# Patient Record
Sex: MALE | Race: BLACK OR AFRICAN AMERICAN
[De-identification: names, ages, dates, MRNs, and addresses within clinical notes are randomized per-mention and may not be internally consistent; named-entity substitution may affect disease eponyms.]

---

## 2019-08-02 ENCOUNTER — HOSPITAL ENCOUNTER (EMERGENCY)
Dept: HOSPITAL 63 - ER | Age: 24
LOS: 1 days | Discharge: HOME | End: 2019-08-03
Payer: SELF-PAY

## 2019-08-02 VITALS — HEIGHT: 70 IN | BODY MASS INDEX: 28.63 KG/M2 | WEIGHT: 200 LBS
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
## 2019-08-02 DIAGNOSIS — S93.402A: ICD-10-CM

## 2019-08-02 DIAGNOSIS — S92.102A: Primary | ICD-10-CM
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
## 2019-08-02 PROCEDURE — 29515 APPLICATION SHORT LEG SPLINT: CPT

## 2019-08-02 PROCEDURE — 73630 X-RAY EXAM OF FOOT: CPT

## 2019-08-02 PROCEDURE — 73610 X-RAY EXAM OF ANKLE: CPT

## 2019-08-02 PROCEDURE — 99284 EMERGENCY DEPT VISIT MOD MDM: CPT

## 2019-08-02 NOTE — ED.ADGEN
Adult General


Chief Complaint


Chief Complaint


"... I was playing foot ball last thursday.. and another player came down hard 

on this lt ankle and foot.. It still swollen and painful.. it burns like 

crazy..like if I ve been sleeping.. and get up out bed... "  " I hoping to get 

to play in next two games.."





HPI


HPI





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 

with above hx and complaints Lt foot and ankle injury.  Left foot and ankle is 

obviously swollen. It is ecchymotic. Pain is localized more on the lateral 

malleolus and mid foot.  Distal neurovascular intact. No upper leg tenderness. 

Patient denies other injury. Patient denies any health history of any 

significance. Patient recently from [place] where he went to [employer] 

at [facility].   Patient is here on football scholarship with [employer]





Review of Systems


Review of Systems





Constitutional: Denies fever or chills []


Eyes: Denies change in visual acuity, redness, or eye pain []


HENT: Denies nasal congestion or sore throat []


Respiratory: Denies cough or shortness of breath []


Cardiovascular: No additional information not addressed in HPI []


GI: Denies abdominal pain, nausea, vomiting, bloody stools or diarrhea []


: Denies dysuria or hematuria []


Musculoskeletal: Complains of left foot and ankle pain and edema


Integument: Denies rash or skin lesions []


Neurologic: Denies headache, focal weakness or sensory changes []


Endocrine: Denies polyuria or polydipsia []





All other systems were reviewed and found to be within normal limits, except as 

documented in this note.





Family History


Family History


Noncontributory





Current Medications


Current Medications


See nursing for home meds





Allergies


Allergies





Allergies








Coded Allergies Type Severity Reaction Last Updated Verified


 


  No Known Drug Allergies    19 No











Physical Exam


Physical Exam





Constitutional: Well developed, well nourished, moderately acute distress, non-

toxic appearance. []


HENT: Normocephalic, atraumatic, bilateral external ears normal, oropharynx 

moist, no oral exudates, nose normal. []


Eyes: PERRLA, EOMI, conjunctiva normal, no discharge. [] Glasses


Neck: Normal range of motion, no tenderness, supple, no stridor. [] 


Cardiovascular:Heart rate regular rhythm, no murmur []


Lungs & Thorax:  Bilateral breath sounds clear to auscultation []


Abdomen: Bowel sounds normal, soft, no tenderness, no masses, no pulsatile 

masses. [] 


Skin: Warm, dry, no erythema, no rash. [] 


Back: No tenderness, no CVA tenderness. [] 


Extremities: No tenderness, no cyanosis, no clubbing, ROM intact, no edema. [] 

Except findings in left ankle and foot


Neurologic: Alert and oriented X 3, normal motor function, normal sensory 

function, no focal deficits noted. []


Psychologic: Affect anxious, judgement normal, mood normal. []





Current Patient Data


Vital Signs





                                   Vital Signs








  Date Time  Temp Pulse Resp B/P (MAP) Pulse Ox O2 Delivery O2 Flow Rate FiO2


 


8/3/19 00:35  64 20 148/84 (105) 98 Room Air  


 


19 22:48 97.6       











EKG


EKG


[]





Radiology/Procedures


Radiology/Procedures


[][facility] [address], [place] [address] [phone]


                              [facility] [address], [place] [address] [phone]


                                        


                                 IMAGING REPORT





                                     Signed





PATIENT: JOCELYNE MERCEDES   ACCOUNT: [id number]     MRN#: [id number]


: 1995           LOCATION: ER              AGE: 24


SEX: M                    EXAM DT: 19         ACCESSION#: [id number]


STATUS: REG ER            ORD. PHYSICIAN: LAZARO RENEE MD   


REASON: Left ankle and foot football injury x1 week ago.


PROCEDURE: FOOT LEFT 3V





EXAM: 


AP, oblique and lateral views left foot


AP, oblique and lateral views left ankle


 


DATE: 2019 10:54 PM


 


INDICATION: Left ankle and foot injury one week ago with pain


 


COMPARISON: No Prior


 


FINDINGS:


Well-corticated ossicle posterior to the talus may represent os trigonum 


given the cortication. However posterior talar avulsion fracture are not 


excluded. Fat infiltration is seen within indicators fat pad. Achilles 


tendon silhouette is grossly preserved. Ankle mortise and talar dome are 


intact.


 


Diffuse soft tissue swelling is seen about the left foot and ankle. Hallux


valgus. Bipartite medial hallux MTP sesamoid. No tarsometatarsal offset.


 


IMPRESSION:


Well-corticated ossicle posterior to the talus may represent os trigonum 


or posterior talar process avulsion fracture.


Mild fat infiltration is seen within indicators fat pad.


Diffuse soft tissue swelling about the left foot and ankle.


Hallux valgus


 


Electronically signed by: Valentino Ramirez MD (2019 11:18 PM) [facility] [place] [facility]














DICTATED AND SIGNED BY:     VALENTINO RAMIREZ MD


DATE:     19





CC: LAZARO RENEE MD; PCP,NO ~





                                 IMAGING REPORT





                                     Signed





PATIENT: JOCELYNE MERCEDES   ACCOUNT: [id number]     MRN#: [id number]


: 1995           LOCATION: ER              AGE: 24


SEX: M                    EXAM DT: 19         ACCESSION#: [id number]


STATUS: REG ER            ORD. PHYSICIAN: LAZARO RENEE MD   


REASON: Left ankle and foot football injury x1 week ago.


PROCEDURE: ANKLE LEFT 3V





EXAM: 


AP, oblique and lateral views left foot


AP, oblique and lateral views left ankle


 


DATE: 2019 10:54 PM


 


INDICATION: Left ankle and foot injury one week ago with pain


 


COMPARISON: No Prior


 


FINDINGS:


Well-corticated ossicle posterior to the talus may represent os trigonum 


given the cortication. However posterior talar avulsion fracture are not 


excluded. Fat infiltration is seen within indicators fat pad. Achilles 


tendon silhouette is grossly preserved. Ankle mortise and talar dome are 


intact.


 


Diffuse soft tissue swelling is seen about the left foot and ankle. Hallux


valgus. Bipartite medial hallux MTP sesamoid. No tarsometatarsal offset.


 


IMPRESSION:


Well-corticated ossicle posterior to the talus may represent os trigonum 


or posterior talar process avulsion fracture.


Mild fat infiltration is seen within indicators fat pad.


Diffuse soft tissue swelling about the left foot and ankle.


Hallux valgus


 


Electronically signed by: Valentino Ramirez MD (2019 11:18 PM) [facility] [place]














DICTATED AND SIGNED BY:     VALENTINO RAMIREZ MD


DATE:     19





CC: LAZARO RENEE MD; PCP,NO ~





Course & Med Decision Making


Course & Med Decision Making


Pertinent Labs and Imaging studies reviewed. (See chart for details)





Distal neurovascular intact post splint.  





Patient use crutches and where splint. Patient keep ankle and foot elevated. 

Patient take Tylenol and ibuprofen for pain. Patient follow-up primary care. 

Patient follow-up orthopedics. Patient return if any concerns.





[]





Final Impression


Final Impression


1. Left ankle and foot sprain strain[]


2. Evulsion fracture talus left foot





Dragon Disclaimer


Dragon Disclaimer


This electronic medical record was generated, in whole or in part, using a voice

 recognition dictation system.





Discharge Summary


Visit Information


Final Diagnosis


Problems


Medical Problems:


(1) Ankle sprain


Status: Acute  





(2) Avulsion fracture


Status: Acute  





(3) Sprain of foot, left


Status: Acute  











Brief Hospital Course


Allergies





                                    Allergies








Coded Allergies Type Severity Reaction Last Updated Verified


 


  No Known Drug Allergies    19 No








Vital Signs





Vital Signs








  Date Time  Temp Pulse Resp B/P (MAP) Pulse Ox O2 Delivery O2 Flow Rate FiO2


 


8/3/19 00:35  64 20 148/84 (105) 98 Room Air  


 


19 22:48 97.6       








Brief Hospital Course


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 

presented with Lt ankle and foot sprain.  Appears to have avulsion fx. talus.





Discharge Information


Condition at Discharge:  Stable


Disposition/Orders:  D/C to Home


Dischare Medications





Active Scripts


Active


Reported


No Known Medications Prior To Admisstion (Info)  Each 1 Each MC PRN PRN








Dragon Disclaimer


This chart was dictated in whole or in part using Voice Recognition software in 

a busy, high-work load, and often noisy Emergency Department environment.  It 

may contain unintended and wholly unrecognized errors or omissions.











LAZARO RENEE MD            Aug 2, 2019 22:38

## 2019-08-02 NOTE — RAD
EXAM: 

AP, oblique and lateral views left foot

AP, oblique and lateral views left ankle

 

DATE: 8/2/2019 10:54 PM

 

INDICATION: Left ankle and foot injury one week ago with pain

 

COMPARISON: No Prior

 

FINDINGS:

Well-corticated ossicle posterior to the talus may represent os trigonum 

given the cortication. However posterior talar avulsion fracture are not 

excluded. Fat infiltration is seen within indicators fat pad. Achilles 

tendon silhouette is grossly preserved. Ankle mortise and talar dome are 

intact.

 

Diffuse soft tissue swelling is seen about the left foot and ankle. Hallux

valgus. Bipartite medial hallux MTP sesamoid. No tarsometatarsal offset.

 

IMPRESSION:

Well-corticated ossicle posterior to the talus may represent os trigonum 

or posterior talar process avulsion fracture.

Mild fat infiltration is seen within indicators fat pad.

Diffuse soft tissue swelling about the left foot and ankle.

Hallux valgus

 

Electronically signed by: Valentino Paniagua MD (8/2/2019 11:18 PM) [facility]

## 2019-08-02 NOTE — RAD
EXAM: 

AP, oblique and lateral views left foot

AP, oblique and lateral views left ankle

 

DATE: 8/2/2019 10:54 PM

 

INDICATION: Left ankle and foot injury one week ago with pain

 

COMPARISON: No Prior

 

FINDINGS:

Well-corticated ossicle posterior to the talus may represent os trigonum 

given the cortication. However posterior talar avulsion fracture are not 

excluded. Fat infiltration is seen within indicators fat pad. Achilles 

tendon silhouette is grossly preserved. Ankle mortise and talar dome are 

intact.

 

Diffuse soft tissue swelling is seen about the left foot and ankle. Hallux

valgus. Bipartite medial hallux MTP sesamoid. No tarsometatarsal offset.

 

IMPRESSION:

Well-corticated ossicle posterior to the talus may represent os trigonum 

or posterior talar process avulsion fracture.

Mild fat infiltration is seen within indicators fat pad.

Diffuse soft tissue swelling about the left foot and ankle.

Hallux valgus

 

Electronically signed by: Valentino Paniagua MD (8/2/2019 11:18 PM) [facility]

## 2019-08-03 VITALS — SYSTOLIC BLOOD PRESSURE: 148 MMHG | DIASTOLIC BLOOD PRESSURE: 84 MMHG
